# Patient Record
Sex: FEMALE | Race: WHITE | NOT HISPANIC OR LATINO | Employment: FULL TIME | URBAN - METROPOLITAN AREA
[De-identification: names, ages, dates, MRNs, and addresses within clinical notes are randomized per-mention and may not be internally consistent; named-entity substitution may affect disease eponyms.]

---

## 2018-12-08 ENCOUNTER — HOSPITAL ENCOUNTER (EMERGENCY)
Facility: CLINIC | Age: 68
Discharge: HOME | End: 2018-12-08
Attending: EMERGENCY MEDICINE | Admitting: EMERGENCY MEDICINE
Payer: COMMERCIAL

## 2018-12-08 VITALS
SYSTOLIC BLOOD PRESSURE: 130 MMHG | RESPIRATION RATE: 20 BRPM | HEART RATE: 80 BPM | HEIGHT: 63 IN | TEMPERATURE: 97.5 F | WEIGHT: 171 LBS | BODY MASS INDEX: 30.3 KG/M2 | OXYGEN SATURATION: 97 % | DIASTOLIC BLOOD PRESSURE: 80 MMHG

## 2018-12-08 DIAGNOSIS — T30.4 CHEMICAL BURN: ICD-10-CM

## 2018-12-08 PROCEDURE — 90715 TDAP VACCINE 7 YRS/> IM: CPT | Performed by: EMERGENCY MEDICINE

## 2018-12-08 PROCEDURE — 90471 IMMUNIZATION ADMIN: CPT

## 2018-12-08 PROCEDURE — 25000128 H RX IP 250 OP 636: Performed by: EMERGENCY MEDICINE

## 2018-12-08 PROCEDURE — 99282 EMERGENCY DEPT VISIT SF MDM: CPT | Mod: 25

## 2018-12-08 RX ORDER — OXYCODONE AND ACETAMINOPHEN 5; 325 MG/1; MG/1
1-2 TABLET ORAL EVERY 4 HOURS PRN
Qty: 12 TABLET | Refills: 0 | Status: SHIPPED | OUTPATIENT
Start: 2018-12-08

## 2018-12-08 RX ADMIN — CLOSTRIDIUM TETANI TOXOID ANTIGEN (FORMALDEHYDE INACTIVATED), CORYNEBACTERIUM DIPHTHERIAE TOXOID ANTIGEN (FORMALDEHYDE INACTIVATED), BORDETELLA PERTUSSIS TOXOID ANTIGEN (GLUTARALDEHYDE INACTIVATED), BORDETELLA PERTUSSIS FILAMENTOUS HEMAGGLUTININ ANTIGEN (FORMALDEHYDE INACTIVATED), BORDETELLA PERTUSSIS PERTACTIN ANTIGEN, AND BORDETELLA PERTUSSIS FIMBRIAE 2/3 ANTIGEN 0.5 ML: 5; 2; 2.5; 5; 3; 5 INJECTION, SUSPENSION INTRAMUSCULAR at 09:47

## 2018-12-08 ASSESSMENT — ENCOUNTER SYMPTOMS: WOUND: 1
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
## 2018-12-08 NOTE — ED AVS SNAPSHOT
Emergency Department    [address] [place] [address]    Phone:  [phone]    Fax:  [phone]                                       Daphney Samuel   MRN: [id number]    Department:   Emergency Department   Date of Visit:  12/8/2018           After Visit Summary Signature Page     I have received my discharge instructions, and my questions have been answered. I have discussed any challenges I see with this plan with the nurse or doctor.    ..........................................................................................................................................  Patient/Patient Representative Signature      ..........................................................................................................................................  Patient Representative Print Name and Relationship to Patient    ..................................................               ................................................  Date                                   Time    ..........................................................................................................................................  Reviewed by Signature/Title    ...................................................              ..............................................  Date                                               Time          22EPIC Rev 08/18
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
## 2018-12-08 NOTE — ED NOTES
Poison Control contacted and provided with product name and bottle labeling.  They advise that the ph is 13.8, to rinse with water or normal saline for 30 minutes to remove all of chemical.  Do not use vinegar as suggested on the bottle. Once all of chemical is removed, provide supportive care. No need for further poison control follow up.

## 2022-07-05 ENCOUNTER — APPOINTMENT (OUTPATIENT)
Dept: CT IMAGING | Facility: CLINIC | Age: 72
End: 2022-07-05
Attending: PHYSICIAN ASSISTANT
Payer: COMMERCIAL

## 2022-07-05 ENCOUNTER — HOSPITAL ENCOUNTER (EMERGENCY)
Facility: CLINIC | Age: 72
Discharge: HOME | End: 2022-07-05
Attending: PHYSICIAN ASSISTANT | Admitting: PHYSICIAN ASSISTANT
Payer: COMMERCIAL

## 2022-07-05 VITALS
HEART RATE: 80 BPM | SYSTOLIC BLOOD PRESSURE: 147 MMHG | WEIGHT: 183 LBS | OXYGEN SATURATION: 99 % | TEMPERATURE: 97.4 F | RESPIRATION RATE: 18 BRPM | DIASTOLIC BLOOD PRESSURE: 78 MMHG | BODY MASS INDEX: 32.42 KG/M2

## 2022-07-05 DIAGNOSIS — S09.90XA HEAD INJURY, INITIAL ENCOUNTER: ICD-10-CM

## 2022-07-05 DIAGNOSIS — S01.01XA SCALP LACERATION, INITIAL ENCOUNTER: ICD-10-CM

## 2022-07-05 DIAGNOSIS — S01.81XA FACIAL LACERATION, INITIAL ENCOUNTER: ICD-10-CM

## 2022-07-05 PROCEDURE — 70450 CT HEAD/BRAIN W/O DYE: CPT

## 2022-07-05 PROCEDURE — 12001 RPR S/N/AX/GEN/TRNK 2.5CM/<: CPT

## 2022-07-05 PROCEDURE — 99284 EMERGENCY DEPT VISIT MOD MDM: CPT | Mod: 25

## 2022-07-05 PROCEDURE — 12011 RPR F/E/E/N/L/M 2.5 CM/<: CPT

## 2022-07-05 ASSESSMENT — ENCOUNTER SYMPTOMS
NUMBNESS: 0
WOUND: 1
SPEECH DIFFICULTY: 0
VOMITING: 0
HEADACHES: 1
NECK PAIN: 0
NAUSEA: 0
NECK STIFFNESS: 0
DIZZINESS: 0

## 2022-07-06 NOTE — ED PROVIDER NOTES
History     Chief Complaint:  Head Laceration       HPI   Daphney Samuel is a 71 year old female who presents with with injury to her head.  She has 2 lacerations of her scalp 1 on her left forehead and one on her right parietal scalp.  She sustained these injuries after a large hammer fell on her head.  She denies any loss consciousness neck pain or vomiting.  She is on Eliquis.  Patient reports that she does have a headache.  She is accompanied by her family member here today.  Her tetanus was in 2018    ROS:  Review of Systems   Gastrointestinal: Negative for nausea and vomiting.   Musculoskeletal: Negative for neck pain and neck stiffness.   Skin: Positive for wound.   Neurological: Positive for headaches. Negative for dizziness, speech difficulty and numbness.   All other systems reviewed and are negative.    Allergies:  Morphine     Medications:    oxyCODONE-acetaminophen (PERCOCET) 5-325 MG tablet        Past Medical History:    Past Medical History:   Diagnosis Date     Anxiety state, unspecified      Atrial fibrillation (H)      Diverticulitis of colon (without mention of hemorrhage)(562.11)      Lumbago      Major depressive disorder, single episode in full remission (H)      Osteoporosis, unspecified      Other and unspecified alcohol dependence, unspecified drinking behavior      Other and unspecified hyperlipidemia      Panic disorder without agoraphobia      There is no problem list on file for this patient.       Past Surgical History:    Past Surgical History:   Procedure Laterality Date     BREAST SURGERY      Augmentation and removal     ENT SURGERY      T&A     GYN SURGERY      tubal ligation     THORACIC SURGERY      pulmonary nodule removed 2009        Family History:    family history is not on file.    Social History:   reports that she has quit smoking. She has never used smokeless tobacco. She reports that she does not drink alcohol and does not use drugs.  PCP: Shanti Rob MD      Physical Exam     Patient Vitals for the past 24 hrs:   BP Temp Temp src Pulse Resp SpO2 Weight   07/05/22 2304 (!) 147/78 -- -- 80 18 99 % --   07/05/22 2100 -- 97.4  F (36.3  C) Temporal -- -- -- --   07/05/22 2010 (!) 159/82 -- -- 78 18 96 % 83 kg (183 lb)      Physical Exam  HENT:      Head:         General: Vital signs reviewed  Eyes: Nonicteric, noninjected, normal range of motion, PERRLA  Nose: Not congested, no rhinorrhea  Ears: Bilateral tympanic membranes are pearly gray without erythema, or bulging.  Canals are free of discharge.  TMs are intact.  Oropharynx: No erythema of the back of the throat, uvula midline, moist mucous membranes, no tonsillitis, no trismus.  Neck: No cervical midline tenderness.  Full ROM without pain.  Lungs: Nonlabored breathing  Skin: Linear laceration over left forehead.  0.5 cm laceration over right parietal scalp.  Neuro: Alert and oriented x3, symmetrical facial features, speech normal, bilateral upper extremity strength 5-5 with , 5-5 bilateral lower extremity strength with flexion-extension of the hips, knees, ankles.    No pronator drift.  Sensation equal and normal grossly bilaterally.  No tongue deviation.    Emergency Department Course   ECG:      Imaging:  Head CT w/o contrast   Final Result   IMPRESSION:   1.  Left frontal scalp swelling and laceration. No acute intracranial hemorrhage or calvarial fracture.         Report per radiology    Laboratory:  Labs Ordered and Resulted from Time of ED Arrival to Time of ED Departure - No data to display     Procedures       Laceration Repair      Procedure: Laceration Repair    Indication: Laceration    Consent: Verbal    Location: Left Face     Length: 2 cm    Preparation: Irrigation with Sterile Saline.    Anesthesia/Sedation: Lidocaine with Epinephrine - 1%      Treatment/Exploration: Wound explored, no foreign bodies found     Closure: The wound was closed with one layer. Skin/superficial layer was closed with 4 x  4-0 Polypropylene (prolene)  using Interrupted sutures.     Patient Status: The patient tolerated the procedure well: Yes. There were no complications.    Laceration Repair      Procedure: Laceration Repair    Indication: Laceration    Consent: Verbal    Location: Right Scalp     Length: 0.5 cm    Preparation: Irrigation with Sterile Saline.    Anesthesia/Sedation: Lidocaine with Epinephrine - 1%      Treatment/Exploration: Wound explored, no foreign bodies found     Closure: The wound was closed with one layer. Skin/superficial layer was closed with 2 x 4-0 Polypropylene (prolene)  using Interrupted sutures.     Patient Status: The patient tolerated the procedure well: Yes. There were no complications.      Emergency Department Course:             Reviewed:  I reviewed nursing notes, vitals, past medical history and [facility]    Assessments/Consults:          Interventions:  Medications - No data to display     Disposition:  The patient was discharged to home.     Impression & Plan    CMS Diagnoses: None    Medical Decision Making:    I considered multiple diagnoses including but not limited to: Laceration, fracture, ligament injury, nerve injury, vascular injury.  After evaluation based on her history of present illness and exam findings I did not feel that she exhibited in any of the above diagnoses except for laceration of the skin and subcutaneous tissues.  The skin was approximated well and the patient tolerated the procedure.  She should monitor for signs of infection which include erythema, swelling, pain, purulent discharge, lymphatic streaking.  If he notes any of these signs or symptoms he should return or seek an evaluation.  Sutures need to be removed in 7 days. She can utilize over-the-counter anti-inflammatories for pain control.  No swimming till sutures are removed.  He may shower and gently rinse the area.  Tetanus was up to date.    I also considereddifferential diagnosis also includes skull fracture  and various types of intracranial hemorrhage (e.g., epidural hematoma, subdural hematoma).She is anticoagulated on eliquis.  I have discussed the risk/benefit analysis with the patient and family regarding CT imaging.  Ct imaging of the head was obtained and was negative for intracranial injury. She understands  return is required for worsening headache, vomiting, confusion, and other concerning symptoms. I provided information regarding on head injury in writing upon discharge. We discussed the second impact syndrome. I recommended primary care follow up in 2-3 days for recheck, and return precautions as above.    My impression of today's diagnosis is:     ICD-10-CM    1. Head injury, initial encounter  S09.90XA    2. Scalp laceration, initial encounter  S01.01XA    3. Facial laceration, initial encounter  S01.81XA       Discharge Medications:  Discharge Medication List as of 7/5/2022 11:04 PM         7/5/2022   Luca Fuller PA-C Kruger, Jacob C, PA-C  07/06/22 0928

## 2022-07-06 NOTE — ED TRIAGE NOTES
Patient was moving a ladder 20 minutes ago and had a can that hit her on the head. Patient did not loose consciousness. Patient is on blood thinners.     Triage Assessment     Row Name 07/05/22 2009       Triage Assessment (Adult)    Airway WDL WDL       Respiratory WDL    Respiratory WDL WDL       Skin Circulation/Temperature WDL    Skin Circulation/Temperature WDL WDL       Cardiac WDL    Cardiac WDL WDL       Peripheral/Neurovascular WDL    Peripheral Neurovascular WDL WDL       Cognitive/Neuro/Behavioral WDL    Cognitive/Neuro/Behavioral WDL WDL

## 2022-07-31 ENCOUNTER — HEALTH MAINTENANCE LETTER (OUTPATIENT)
Age: 72
End: 2022-07-31

## 2022-10-16 ENCOUNTER — HEALTH MAINTENANCE LETTER (OUTPATIENT)
Age: 72
End: 2022-10-16

## 2023-11-04 ENCOUNTER — HEALTH MAINTENANCE LETTER (OUTPATIENT)
Age: 73
End: 2023-11-04

## 2024-08-10 ENCOUNTER — HEALTH MAINTENANCE LETTER (OUTPATIENT)
Age: 74
End: 2024-08-10

## 2024-12-22 ENCOUNTER — HEALTH MAINTENANCE LETTER (OUTPATIENT)
Age: 74
End: 2024-12-22